# Patient Record
(demographics unavailable — no encounter records)

---

## 2024-12-09 NOTE — HISTORY OF PRESENT ILLNESS
[FreeTextEntry1] : 70M w/ ED, BPH, and Hx of urinary frequency, for annual f/u s/p Insertion of IPP on 11/06/13, AMS CX MS Pump Complains that the erection is no longer fully rigid.  He also states that the only way of achieving intercourse is with his wife on top. Overall pt is happy w/ implant, but he does report that it does take mor effort than previously to get IPP rock hard He uses 20 mg Viagra as needed to assist with engorgement which has helped

## 2024-12-09 NOTE — END OF VISIT
[Time Spent: ___ minutes] : I have spent [unfilled] minutes of time on the encounter which excludes teaching and separately reported services. [FreeTextEntry3] : The complete time calculation ( 35 minutes) for this patient encounter, which excludes teaching and separately reported services, but includes a review of the patient's past medical records pertinent to his chief complaint, including medical, and surgical history, review of medications taken and of previous visits with other health care providers. In addition to the time spent with the patient, the total time calculation also includes the time necessary to document all of the information gathered during this session into the patient's electronic health records.

## 2024-12-09 NOTE — ASSESSMENT
[FreeTextEntry1] : BLADDER SCAN: Indication: Increased frequency of urination. Initial Volume: 201   cc PVR:89   cc Results: Urinary retention Recommendations: No Therapy Needed.  URO FLOWMETRY: Indication: Increased frequency of urination. Urinary Flow Rate:   10.3 m/s Results: Urinary retention Recommendations: No therapy needed.

## 2024-12-09 NOTE — PHYSICAL EXAM
[FreeTextEntry2] : Basil Marina NP [TextEntry] : The patient is status post insertion of inflatable multicomponent inflatable penile prosthesis in the past. The overall appearance is excellent there is no edema, swelling or erythema. The incision is well-healed the edges are well approximated there is no discharge. Cylinders sizing is excellent.  The distal tips are well positioned into the mid glans. The appearance of the cylinders deflated and the shaft of the penis flaccid is also good. Scrotal skin is intact the location of the pump is excellent it is in in in the back of the scrotum, well concealed, yet accessible to manipulation. There is no tethering of the pump. The reservoir in the lower quadrant of the abdomen is nonpalpable.

## 2024-12-09 NOTE — PLAN
[TextEntry] : A/P, 70M w/ ED, BPH, and Hx of urinary frequency, for annual f/u - s/p Insertion of IPP on 11/06/13, AMS CX MS Pump - pt will consider RR of IPP to insert a [employer] Classic. This was suggested to pt to assist with a harder and harley erection - will try using a cock ring - c/w viagra 20 mg prn - f/u 3 months or as needed.  -will discuss RR of IPP with his wife

## 2025-02-10 NOTE — PHYSICAL EXAM
[No Acute Distress] : no acute distress [Well Nourished] : well nourished [Well Developed] : well developed [Normal Sclera/Conjunctiva] : normal sclera/conjunctiva [Normal Outer Ear/Nose] : the outer ears and nose were normal in appearance [Normal Oropharynx] : the oropharynx was normal [No JVD] : no jugular venous distention [No Lymphadenopathy] : no lymphadenopathy [Supple] : supple [No Respiratory Distress] : no respiratory distress  [No Accessory Muscle Use] : no accessory muscle use [Clear to Auscultation] : lungs were clear to auscultation bilaterally [Normal Rate] : normal rate  [Regular Rhythm] : with a regular rhythm [Normal S1, S2] : normal S1 and S2 [No Carotid Bruits] : no carotid bruits [No Abdominal Bruit] : a ~M bruit was not heard ~T in the abdomen [No Varicosities] : no varicosities [Pedal Pulses Present] : the pedal pulses are present [No Palpable Aorta] : no palpable aorta [No Extremity Clubbing/Cyanosis] : no extremity clubbing/cyanosis [No Axillary Lymphadenopathy] : no axillary lymphadenopathy [Soft] : abdomen soft [Non Tender] : non-tender [Non-distended] : non-distended [No Masses] : no abdominal mass palpated [No HSM] : no HSM [Normal Bowel Sounds] : normal bowel sounds [Normal Posterior Cervical Nodes] : no posterior cervical lymphadenopathy [Normal Anterior Cervical Nodes] : no anterior cervical lymphadenopathy [No CVA Tenderness] : no CVA  tenderness [No Spinal Tenderness] : no spinal tenderness [No Joint Swelling] : no joint swelling [Grossly Normal Strength/Tone] : grossly normal strength/tone [No Rash] : no rash [Coordination Grossly Intact] : coordination grossly intact [No Focal Deficits] : no focal deficits [Normal Affect] : the affect was normal [Normal Insight/Judgement] : insight and judgment were intact [de-identified] : trace edema

## 2025-02-10 NOTE — ASSESSMENT
[FreeTextEntry1] : diabetes last 6.9 HTN well controlled declines Ozempic or Mounjaro again s/p MARIANA no angina  Negative nuclear stress test 12/16/2024

## 2025-02-10 NOTE — HEALTH RISK ASSESSMENT
[Yes] : Yes [2 - 4 times a month (2 pts)] : 2-4 times a month (2 points) [1 or 2 (0 pts)] : 1 or 2 (0 points) [Never (0 pts)] : Never (0 points) [0] : 2) Feeling down, depressed, or hopeless: Not at all (0) [PHQ-2 Negative - No further assessment needed] : PHQ-2 Negative - No further assessment needed [Never] : Never [[id number]] : 0

## 2025-02-10 NOTE — HISTORY OF PRESENT ILLNESS
[FreeTextEntry1] : Here to follow ongoing conditions [de-identified] : 70-year-old male history of diabetes, BPH with LUTS, s/p MARIANA to LAD and OM1 12/2015, negative nuclear stress test 12/16/2024, HTN on meds, obesity, sedentary lifestyle, status post insertion of IPP on November 6, 2013,  recent urology consultation reviewed still working full time Postvoid residual 57 cc, on Flomax slow urinary flow rate 13.8 m/S and was given trial of Myrbetriq Ozempic suggested  but patient declined diabetes  Not athletic, sedentary 2 cardiac stents December 2015 (OM1 and LAD) with no cardiac issues since that time l Hypertension on medications Last nuclear stress test August 15, 2018 that showed some mild fixed defects with no significant ischemia sees urologist, minimal nocturia, has penile implant large post void residua  still on DAP declined colonoscopy, negative Cologuard 8/2022  got Prevnar vaccine 11/8/2019

## 2025-02-10 NOTE — PHYSICAL EXAM
[No Acute Distress] : no acute distress [Well Nourished] : well nourished [Well Developed] : well developed [Normal Sclera/Conjunctiva] : normal sclera/conjunctiva [Normal Outer Ear/Nose] : the outer ears and nose were normal in appearance [Normal Oropharynx] : the oropharynx was normal [No JVD] : no jugular venous distention [No Lymphadenopathy] : no lymphadenopathy [Supple] : supple [No Respiratory Distress] : no respiratory distress  [No Accessory Muscle Use] : no accessory muscle use [Clear to Auscultation] : lungs were clear to auscultation bilaterally [Normal Rate] : normal rate  [Regular Rhythm] : with a regular rhythm [Normal S1, S2] : normal S1 and S2 [No Carotid Bruits] : no carotid bruits [No Abdominal Bruit] : a ~M bruit was not heard ~T in the abdomen [No Varicosities] : no varicosities [Pedal Pulses Present] : the pedal pulses are present [No Palpable Aorta] : no palpable aorta [No Extremity Clubbing/Cyanosis] : no extremity clubbing/cyanosis [No Axillary Lymphadenopathy] : no axillary lymphadenopathy [Soft] : abdomen soft [Non Tender] : non-tender [Non-distended] : non-distended [No Masses] : no abdominal mass palpated [No HSM] : no HSM [Normal Bowel Sounds] : normal bowel sounds [Normal Posterior Cervical Nodes] : no posterior cervical lymphadenopathy [Normal Anterior Cervical Nodes] : no anterior cervical lymphadenopathy [No CVA Tenderness] : no CVA  tenderness [No Spinal Tenderness] : no spinal tenderness [No Joint Swelling] : no joint swelling [Grossly Normal Strength/Tone] : grossly normal strength/tone [No Rash] : no rash [Coordination Grossly Intact] : coordination grossly intact [No Focal Deficits] : no focal deficits [Normal Affect] : the affect was normal [Normal Insight/Judgement] : insight and judgment were intact [de-identified] : trace edema

## 2025-02-10 NOTE — HEALTH RISK ASSESSMENT
[Yes] : Yes [2 - 4 times a month (2 pts)] : 2-4 times a month (2 points) [1 or 2 (0 pts)] : 1 or 2 (0 points) [Never (0 pts)] : Never (0 points) [0] : 2) Feeling down, depressed, or hopeless: Not at all (0) [PHQ-2 Negative - No further assessment needed] : PHQ-2 Negative - No further assessment needed [Never] : Never [[id number]] : 0

## 2025-02-10 NOTE — HISTORY OF PRESENT ILLNESS
[FreeTextEntry1] : Here to follow ongoing conditions [de-identified] : 70-year-old male history of diabetes, BPH with LUTS, s/p MARIANA to LAD and OM1 12/2015, negative nuclear stress test 12/16/2024, HTN on meds, obesity, sedentary lifestyle, status post insertion of IPP on November 6, 2013,  recent urology consultation reviewed still working full time Postvoid residual 57 cc, on Flomax slow urinary flow rate 13.8 m/S and was given trial of Myrbetriq Ozempic suggested  but patient declined diabetes  Not athletic, sedentary 2 cardiac stents December 2015 (OM1 and LAD) with no cardiac issues since that time l Hypertension on medications Last nuclear stress test August 15, 2018 that showed some mild fixed defects with no significant ischemia sees urologist, minimal nocturia, has penile implant large post void residua  still on DAP declined colonoscopy, negative Cologuard 8/2022  got Prevnar vaccine 11/8/2019